# Patient Record
Sex: FEMALE | Race: BLACK OR AFRICAN AMERICAN | NOT HISPANIC OR LATINO | URBAN - METROPOLITAN AREA
[De-identification: names, ages, dates, MRNs, and addresses within clinical notes are randomized per-mention and may not be internally consistent; named-entity substitution may affect disease eponyms.]

---
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
## 2022-02-11 NOTE — ED BEHAVIORAL HEALTH ASSESSMENT NOTE - PATIENT SEEN BY
Size Of Lesion In Cm (Optional): 0.6 Detail Level: Detailed X Size Of Lesion In Cm (Optional): 0.5 X Size Of Lesion In Cm (Optional): 0 Size Of Lesion In Cm (Optional): 0.4 Attending Psychiatrist supervising NP/Trainee and meeting pt

## 2023-04-10 NOTE — ED ADULT NURSE NOTE - NSHOSCREENINGQ1_ED_ALL_ED
having knee surgery in ;may  very inactive  bowels dont move wi;thout  mom   miralax sl help  has ms also
No

## 2023-12-15 NOTE — ED ADULT NURSE NOTE - CARDIO WDL
Fasting blood work ordered    For allergies. Use Flonase regularly in spring and fall to prevent allergic sinus headaches.  Trial of Imitrex for migraines as needed.  Discussed importance of getting plenty of sleep, eating well, and staying well hydrated to prevent onset.    PAP per Gynecology.    For weight, refer to Dietician for medical nutrition therapy.  
8
Normal rate, regular rhythm, normal S1, S2 heart sounds heard.

## 2024-07-01 NOTE — ED PROVIDER NOTE - NS_EDPROVIDERDISPOUSERTYPE_ED_A_ED
Continue to work on increasing fluid intake.  Mom states that prior testing confirmed X-linked [place] for her father.  To continue to monitor Sarbjit.  Previously had microscopic hematuria but no proteinuria with normal renal function and blood pressure.  BP normal today.  To send urine for reevaluation and script provided to assess renal function.  Avoid NSAIDs.  Encouraged follow up regarding snoring for possible sleep study.  Plan for tentative follow up in 1 year if all testing is stable.    Scribe Attestation (For Scribes USE Only)... Scribe Attestation (For Scribes USE Only).../Attending Attestation (For Attendings USE Only)...

## 2025-01-02 NOTE — ED PROVIDER NOTE - NS ED NOTE AC HIGH RISK COUNTRIES
No protocol for requested medication.    Medication: Chlorzoxazone  Last office visit date: 09/26/2024  Pharmacy: [id number] - [place], [place] - [address]    Order pended, routed to clinician for review.    No